# Patient Record
Sex: FEMALE | Race: WHITE | NOT HISPANIC OR LATINO | URBAN - METROPOLITAN AREA
[De-identification: names, ages, dates, MRNs, and addresses within clinical notes are randomized per-mention and may not be internally consistent; named-entity substitution may affect disease eponyms.]

---

## 2023-09-14 ENCOUNTER — OFFICE VISIT (OUTPATIENT)
Dept: FAMILY MEDICINE | Facility: CLINIC | Age: 58
End: 2023-09-14
Payer: COMMERCIAL

## 2023-09-14 VITALS
HEIGHT: 60 IN | SYSTOLIC BLOOD PRESSURE: 120 MMHG | RESPIRATION RATE: 22 BRPM | WEIGHT: 164.4 LBS | BODY MASS INDEX: 32.28 KG/M2 | DIASTOLIC BLOOD PRESSURE: 75 MMHG | HEART RATE: 66 BPM | TEMPERATURE: 98.4 F | OXYGEN SATURATION: 98 %

## 2023-09-14 DIAGNOSIS — Z13.29 SCREENING FOR THYROID DISORDER: ICD-10-CM

## 2023-09-14 DIAGNOSIS — Z13.220 LIPID SCREENING: ICD-10-CM

## 2023-09-14 DIAGNOSIS — Z13.21 ENCOUNTER FOR VITAMIN DEFICIENCY SCREENING: ICD-10-CM

## 2023-09-14 DIAGNOSIS — H72.91 RUPTURED EAR DRUM, RIGHT: ICD-10-CM

## 2023-09-14 DIAGNOSIS — R42 DIZZINESS: ICD-10-CM

## 2023-09-14 DIAGNOSIS — Z13.1 SCREENING FOR DIABETES MELLITUS: ICD-10-CM

## 2023-09-14 DIAGNOSIS — R51.9 NONINTRACTABLE EPISODIC HEADACHE, UNSPECIFIED HEADACHE TYPE: Primary | ICD-10-CM

## 2023-09-14 LAB
ERYTHROCYTE [DISTWIDTH] IN BLOOD BY AUTOMATED COUNT: 11.9 % (ref 10–15)
HBA1C MFR BLD: 5.8 % (ref 0–5.6)
HCT VFR BLD AUTO: 42.4 % (ref 35–47)
HGB BLD-MCNC: 14.3 G/DL (ref 11.7–15.7)
MCH RBC QN AUTO: 28.4 PG (ref 26.5–33)
MCHC RBC AUTO-ENTMCNC: 33.7 G/DL (ref 31.5–36.5)
MCV RBC AUTO: 84 FL (ref 78–100)
PLATELET # BLD AUTO: 266 10E3/UL (ref 150–450)
RBC # BLD AUTO: 5.03 10E6/UL (ref 3.8–5.2)
TSH SERPL DL<=0.005 MIU/L-ACNC: 1.55 UIU/ML (ref 0.3–4.2)
WBC # BLD AUTO: 6.5 10E3/UL (ref 4–11)

## 2023-09-14 PROCEDURE — 82306 VITAMIN D 25 HYDROXY: CPT | Performed by: NURSE PRACTITIONER

## 2023-09-14 PROCEDURE — 80061 LIPID PANEL: CPT | Performed by: NURSE PRACTITIONER

## 2023-09-14 PROCEDURE — 85027 COMPLETE CBC AUTOMATED: CPT | Performed by: NURSE PRACTITIONER

## 2023-09-14 PROCEDURE — 80053 COMPREHEN METABOLIC PANEL: CPT | Performed by: NURSE PRACTITIONER

## 2023-09-14 PROCEDURE — 36415 COLL VENOUS BLD VENIPUNCTURE: CPT | Performed by: NURSE PRACTITIONER

## 2023-09-14 PROCEDURE — 83036 HEMOGLOBIN GLYCOSYLATED A1C: CPT | Performed by: NURSE PRACTITIONER

## 2023-09-14 PROCEDURE — 99204 OFFICE O/P NEW MOD 45 MIN: CPT | Performed by: NURSE PRACTITIONER

## 2023-09-14 PROCEDURE — 84443 ASSAY THYROID STIM HORMONE: CPT | Performed by: NURSE PRACTITIONER

## 2023-09-14 ASSESSMENT — ENCOUNTER SYMPTOMS
HEADACHES: 1
DIZZINESS: 1

## 2023-09-14 ASSESSMENT — PAIN SCALES - GENERAL: PAINLEVEL: NO PAIN (0)

## 2023-09-14 NOTE — PATIENT INSTRUCTIONS
Labs done today.     If labs are normal, then I would recommend an ENT referral to look at your punctured right ear drum.

## 2023-09-14 NOTE — PROGRESS NOTES
"  Assessment & Plan     Nonintractable episodic headache, unspecified headache type  Chronic, but not severe. Lab work up done to r/o possible causes. If all lab work is negative, would recommend ENT referral to see if right ruptured ear drum could be contributing to chronic headaches.     - Comprehensive metabolic panel (BMP + Alb, Alk Phos, ALT, AST, Total. Bili, TP)  - CBC with platelets    Dizziness  Chronic. Only associated with headaches.     - Comprehensive metabolic panel (BMP + Alb, Alk Phos, ALT, AST, Total. Bili, TP)  - CBC with platelets    Ruptured ear drum, right  Chronic. Right hole in ear drum 2/2 flight ~30 years ago. If other work up is normal, will refer to ENT for further evaluation and possible patch surgery.     Encounter for vitamin deficiency screening  Screening.     - Vitamin D Deficiency    Lipid screening  Screening.     - Lipid panel reflex to direct LDL Non-fasting    Screening for thyroid disorder  Screening.     - TSH with free T4 reflex    Screening for diabetes mellitus  Screening.     - Hemoglobin A1c    Ordering of each unique test         BMI:   Estimated body mass index is 32 kg/m  as calculated from the following:    Height as of this encounter: 1.527 m (5' 0.1\").    Weight as of this encounter: 74.6 kg (164 lb 6.4 oz).       Patient Instructions   Labs done today.     If labs are normal, then I would recommend an ENT referral to look at your punctured right ear drum.     Kristen Nunez [facility]    Ishan Claudio is a 58 year old, presenting for the following health issues:  Blood Draw (Would like standard blood levels checked, she has lived in the [place] for the past year, has not been to a doctor, she is feeling good, wondering about going to the eye doctor and a dentist for routine checks etc. ) and Headache (She will get headaches often for many years, had a CT scan in her country in the past they told her there was not a problem there, " "told her to take Benadryl and rest )        9/14/2023     3:11 PM   Additional Questions   Roomed by Kaia   Accompanied by none         9/14/2023     3:11 PM   Patient Reported Additional Medications   Patient reports taking the following new medications Vitamin b12        HPI     Concern - headaches with dizziness during that time, she doesn't get dizzy at other times, only when she gets the headaches   Onset: at least 20 years or so, will get headaches about 3 times a month or if she is thinking too much she will get a headache   Description: as above   Intensity: severe, sometimes are really bad, sometimes will have daily for a week at a time, sometimes they are not too bad  Progression of Symptoms:  same  Accompanying Signs & Symptoms: none  Previous history of similar problem: none  Precipitating factors:        Worsened by: thinking a lot, stress  Alleviating factors:        Improved by: resting, drinking water, cold water on her head sometimes with help   Therapies tried and outcome: None other than Benadryl which does help some     Additional provider notes: Patient presents in clinic for chronic headaches and dizziness and routine labs.     Headaches: chronic for 20+ years. Sometimes they are bad and last a week, other times she is fine. States headaches come every month. Improved with drinking water and taking benadryl. Sometimes she puts cold water on her head. Sometimes she puts a lemon on her head (states that sounds funny, but it works). She doesn't like to take medication as it makes a \"problem for her body\".     Dizziness: off and on symptoms. Appears with headaches. Never shows up by itself.     She is wanting blood work done to check for diabetes, cholesterol.     Right ear drum punctured: incidental finding during exam. Patient reports she had right ear drum rupture after an airplane flight 30 years ago. She was seen and told they could do a procedure, but never had it done.     No Known " "Allergies    No current outpatient medications on file.     No current facility-administered medications for this visit.       No past medical history on file.         Review of Systems   Neurological:  Positive for dizziness and headaches.            Objective    /75 (BP Location: Right arm, Patient Position: Sitting, Cuff Size: Adult Regular)   Pulse 66   Temp 98.4  F (36.9  C) (Tympanic)   Resp 22   Ht 1.527 m (5' 0.1\")   Wt 74.6 kg (164 lb 6.4 oz)   SpO2 98%   Breastfeeding No   BMI 32.00 kg/m    Body mass index is 32 kg/m .  Physical Exam  Vitals reviewed.   Constitutional:       General: She is not in acute distress.     Appearance: Normal appearance. She is not ill-appearing or toxic-appearing.   HENT:      Head: Normocephalic and atraumatic.      Right Ear: Ear canal and external ear normal. There is no impacted cerumen. Tympanic membrane is perforated. Tympanic membrane is not erythematous or bulging.      Left Ear: Tympanic membrane, ear canal and external ear normal. There is no impacted cerumen. Tympanic membrane is not erythematous or bulging.      Nose: Nose normal.      Mouth/Throat:      Mouth: Mucous membranes are moist.      Pharynx: Oropharynx is clear. No posterior oropharyngeal erythema.   Cardiovascular:      Rate and Rhythm: Normal rate and regular rhythm.      Pulses: Normal pulses.      Heart sounds: Normal heart sounds.   Pulmonary:      Effort: Pulmonary effort is normal.      Breath sounds: Normal breath sounds.   Musculoskeletal:         General: Normal range of motion.   Skin:     General: Skin is warm and dry.   Neurological:      Mental Status: She is alert and oriented to person, place, and time.   Psychiatric:         Behavior: Behavior normal.                              "

## 2023-09-15 LAB
ALBUMIN SERPL BCG-MCNC: 4.7 G/DL (ref 3.5–5.2)
ALP SERPL-CCNC: 80 U/L (ref 35–104)
ALT SERPL W P-5'-P-CCNC: 21 U/L (ref 0–50)
ANION GAP SERPL CALCULATED.3IONS-SCNC: 17 MMOL/L (ref 7–15)
AST SERPL W P-5'-P-CCNC: 23 U/L (ref 0–45)
BILIRUB SERPL-MCNC: 0.4 MG/DL
BUN SERPL-MCNC: 19.5 MG/DL (ref 8–23)
CALCIUM SERPL-MCNC: 10.3 MG/DL (ref 8.8–10.2)
CHLORIDE SERPL-SCNC: 99 MMOL/L (ref 98–107)
CHOLEST SERPL-MCNC: 253 MG/DL
CREAT SERPL-MCNC: 0.6 MG/DL (ref 0.51–0.95)
DEPRECATED HCO3 PLAS-SCNC: 22 MMOL/L (ref 22–29)
EGFRCR SERPLBLD CKD-EPI 2021: >90 ML/MIN/1.73M2
GLUCOSE SERPL-MCNC: 107 MG/DL (ref 70–99)
HDLC SERPL-MCNC: 47 MG/DL
LDLC SERPL CALC-MCNC: 127 MG/DL
NONHDLC SERPL-MCNC: 206 MG/DL
POTASSIUM SERPL-SCNC: 4.4 MMOL/L (ref 3.4–5.3)
PROT SERPL-MCNC: 7.9 G/DL (ref 6.4–8.3)
SODIUM SERPL-SCNC: 138 MMOL/L (ref 136–145)
TRIGL SERPL-MCNC: 394 MG/DL

## 2023-09-18 LAB — DEPRECATED CALCIDIOL+CALCIFEROL SERPL-MC: 16 UG/L (ref 20–75)

## 2023-09-21 DIAGNOSIS — E78.2 MIXED HYPERLIPIDEMIA: ICD-10-CM

## 2023-09-21 DIAGNOSIS — E55.9 VITAMIN D DEFICIENCY: Primary | ICD-10-CM

## 2023-09-21 DIAGNOSIS — R73.03 PREDIABETES: ICD-10-CM

## 2023-09-21 RX ORDER — CHOLECALCIFEROL (VITAMIN D3) 50 MCG
1 TABLET ORAL DAILY
Qty: 90 TABLET | Refills: 3 | Status: SHIPPED | OUTPATIENT
Start: 2023-09-21

## 2023-09-22 ENCOUNTER — TELEPHONE (OUTPATIENT)
Dept: FAMILY MEDICINE | Facility: CLINIC | Age: 58
End: 2023-09-22
Payer: COMMERCIAL

## 2023-09-22 DIAGNOSIS — E78.2 MIXED HYPERLIPIDEMIA: ICD-10-CM

## 2023-09-22 DIAGNOSIS — R73.03 PREDIABETES: Primary | ICD-10-CM

## 2023-09-22 NOTE — TELEPHONE ENCOUNTER
Attempt #1 to call patient.     VM not set up - will try later      Essence Torres RN  [facility]: [place]

## 2023-09-22 NOTE — TELEPHONE ENCOUNTER
"----- Message from Kristen Nunez DNP sent at 9/21/2023 11:09 PM CDT -----  Please reach out to patient with lab results:     Normal CBC and TSH.     A1c is slightly elevated putting her in the \"prediabetes\" range. Recommend reducing added sugars, white carbs (changing to whole wheat options), and increasing movement 30 minutes 5 days a week.     CMP: normal except slightly elevated calcium. Corrected calcium (using albumen) is actually only 9.7 so I'm not concerned.     Vitamin D is very low. I would recommend Vitamin D3 supplement 2000 international unit(s). I have sent a script.     Cholesterol panel is significantly elevated/abnormal. I would recommend we start a statin medication to help with her cholesterol and reduce her risk of heart disease. (I can't calculate her ASCVD risk score as her smoking status wasn't filled out. Can you verify what her status is?    The ASCVD Risk score (Amaury BARROW, et al., 2019) failed to calculate for the following reasons:    The smoking status is invalid    Ways to improve your cholesterol...     1- Eats less saturated fats (including avoiding \"trans\" fats).     2 - Eat more unsaturated fats  - found in vegetables, grains, and tree nuts.  Also by replacing butter with canola oil or olive oil.     3 - Eat more nuts.  1-2 ounces (a small handful) of almonds, walnuts, hazelnuts or pecans once a day in place of other less healthy snacks.     4 - Eat more high fiber foods - vegetables and whole grains including oat bran, oats, beans, peas, and flax seed.     5 - Eat more fish - such as salmon, tuna, mackerel, and sardines.  1 or 2 six ounce servings per week is a healthy replacement for other proteins.     6 - Exercise for at least 120 minutes per week - which is equal to 30 minutes 4 days per week.      Thanks,  Kristen Nunez DNP, NP-C   "

## 2023-09-25 RX ORDER — ATORVASTATIN CALCIUM 10 MG/1
10 TABLET, FILM COATED ORAL DAILY
Qty: 90 TABLET | Refills: 3 | Status: SHIPPED | OUTPATIENT
Start: 2023-09-25

## 2023-09-25 NOTE — TELEPHONE ENCOUNTER
Note was updated with smoking status.     The 10-year ASCVD risk score (Amaury BARROW, et al., 2019) is: 5.9%    Values used to calculate the score:      Age: 58 years      Sex: Female      Is Non- : Yes      Diabetic: No      Tobacco smoker: No      Systolic Blood Pressure: 120 mmHg      Is BP treated: No      HDL Cholesterol: 47 mg/dL      Total Cholesterol: 253 mg/dL    Kristen Nunez DNP, NP-C

## 2023-09-25 NOTE — TELEPHONE ENCOUNTER
Rn called and relayed provider message    Patient verbalized understanding and in agreement with plan of care.     Pt reports never smoking     Essence Torres RN

## 2023-09-25 NOTE — TELEPHONE ENCOUNTER
Routing to provider    There is not an rx for statin but it is recommended please place rx.     Pt is also wondering when to recheck A1C    Essence Torres RN

## 2023-09-25 NOTE — TELEPHONE ENCOUNTER
I sent the script for atorvastatin. Let patietn know to monitor for muscle aches/pains as a common side effect and to let us know if it doesn't resolve after 2 weeks on medication.     Follow-up in 6 months for repeat A1c and lipid labs.     Can you also update her smoking status so I can calculate her ASCVD score?    Thanks!  Kristen Nunez DNP, NP-C

## 2024-03-07 ENCOUNTER — TELEPHONE (OUTPATIENT)
Dept: FAMILY MEDICINE | Facility: CLINIC | Age: 59
End: 2024-03-07
Payer: COMMERCIAL

## 2024-03-07 NOTE — LETTER
March 21, 2024    To  Shanon Mirza  [address]  [place] [address]    Your team at [facility] cares about your health. We have reviewed your chart and based on our findings; we are making the following recommendations to better manage your health.     You are in particular need of attention regarding the following:     Call or MyChart message your clinic to schedule a colonoscopy, schedule/ a FIT Test, or order a Cologuard test. If you are unsure what type of test you need, please call your clinic and speak to clinic staff.   Colon cancer is now the second leading cause of cancer-related deaths in the [facility] for both men and women and there are over 130,000 new cases and 50,000 deaths per year from colon cancer. Colonoscopies can prevent 90-95% of these deaths. Problem lesions can be removed before they ever become cancer. This test is not only looking for cancer, but also getting rid of precancerous lesions.   If you are under/uninsured, we recommend you contact the [employer] Program.[employer] is a free colorectal cancer screening program that provides colonoscopies for eligible under/uninsured [place] men and women. If you are interested in receiving a free colonoscopy, please call [facility] at t [phone] (mention code [employer]) to see if you're eligible. Please have them send us the results.   Please call [phone] to schedule a colonoscopy.  Schedule an office visit with your provider if you are interested in completing your colon cancer screening with a Cologuard test  Schedule Annual MAMMOGRAPHY. [facility] scheduling number is [phone] or schedule in [employer] (self referral).  1 in 8 women will develop invasive breast cancer during her lifetime and it is the most common non-skin cancer in [place]. EARLY detection, new treatments, and a better understanding of the disease have increased survival rates- the 5 year survival rate in the 1960's  was 63% and today it is close to 90%.  If you are under/uninsured, we recommend you contact the [facility] Program. They offer mammograms at no charge or on a sliding fee charge. You can schedule with them at [phone]. Please have them send us the results.   Schedule a primary care office visit with your provider for a Pap Smear to screen for Cervical Cancer.  PREVENTATIVE VISIT: Physical    If you have already completed these items, please contact the clinic via phone or   [employer] so your care team can review and update your records. Thank you for   choosing [facility] for your healthcare needs. For any questions,   concerns, or to schedule an appointment please contact our clinic.    Healthy Regards,      Your [facility] Care Team

## 2024-03-07 NOTE — TELEPHONE ENCOUNTER
Patient Quality Outreach    Patient is due for the following:   Colon Cancer Screening  Breast Cancer Screening - Mammogram  Cervical Cancer Screening - PAP Needed  Physical Preventive Adult Physical      Topic Date Due    Diptheria Tetanus Pertussis (DTAP/TDAP/TD) Vaccine (1 - Tdap) Never done    Zoster (Shingles) Vaccine (1 of 2) Never done    Flu Vaccine (1) Never done    COVID-19 Vaccine (1 - 2023-24 season) Never done       Next Steps:   Schedule a Annual Wellness Visit pap    Type of outreach:    Phone, left message for patient/parent to call back.      Questions for provider review:    None           Kaia Frias MA  Chart routed to Care Team.

## 2024-03-21 NOTE — TELEPHONE ENCOUNTER
Patient Quality Outreach    Patient is due for the following:   Colon Cancer Screening  Breast Cancer Screening - Mammogram  Cervical Cancer Screening - PAP Needed  Physical Preventive Adult Physical    Next Steps:   Schedule a Adult Preventative with PAP and Mammogram with radiology Colon CA screen     Type of outreach:    Sent letter.    Next Steps:  Reach out within 90 days via Phone.    Max number of attempts reached: Yes. Will try again in 90 days if patient still on fail list.    Questions for provider review:    None           Kaia Frias MA  Chart routed to none .

## 2024-07-10 ENCOUNTER — OFFICE VISIT (OUTPATIENT)
Dept: FAMILY MEDICINE | Facility: CLINIC | Age: 59
End: 2024-07-10
Payer: COMMERCIAL

## 2024-07-10 VITALS
TEMPERATURE: 98.6 F | HEART RATE: 73 BPM | HEIGHT: 61 IN | BODY MASS INDEX: 32.51 KG/M2 | SYSTOLIC BLOOD PRESSURE: 127 MMHG | WEIGHT: 172.2 LBS | OXYGEN SATURATION: 98 % | DIASTOLIC BLOOD PRESSURE: 75 MMHG

## 2024-07-10 DIAGNOSIS — Z12.31 VISIT FOR SCREENING MAMMOGRAM: ICD-10-CM

## 2024-07-10 DIAGNOSIS — H04.123 DRY EYES: ICD-10-CM

## 2024-07-10 DIAGNOSIS — L03.012 PARONYCHIA OF LEFT THUMB: Primary | ICD-10-CM

## 2024-07-10 DIAGNOSIS — Z12.11 SCREEN FOR COLON CANCER: ICD-10-CM

## 2024-07-10 PROCEDURE — 99214 OFFICE O/P EST MOD 30 MIN: CPT | Performed by: NURSE PRACTITIONER

## 2024-07-10 RX ORDER — CEPHALEXIN 500 MG/1
500 CAPSULE ORAL 4 TIMES DAILY
Qty: 20 CAPSULE | Refills: 0 | Status: SHIPPED | OUTPATIENT
Start: 2024-07-10 | End: 2024-07-15

## 2024-07-10 ASSESSMENT — PAIN SCALES - GENERAL: PAINLEVEL: WORST PAIN (10)

## 2024-07-10 NOTE — LETTER
July 16, 2024    Shanon Mirza  [address] [address]          Dear MsIonYuki,    We are writing to inform you of your test results.  Stool test for colon cancer screen is  Negative.   This  Test should be repeated annually.       Please contact the clinic if you have additional questions.  Thank you.       Resulted Orders   Fecal colorectal cancer screen (FIT)   Result Value Ref Range    Occult Blood Screen FIT Negative Negative       If you have any questions or concerns, please call the clinic at the number listed above.       Sincerely,      MELONIE Andrade CNP

## 2024-07-10 NOTE — PROGRESS NOTES
"  Assessment & Plan     (L03.012) Paronychia of left thumb  (primary encounter diagnosis)  Comment: nail discoloration and paronychia with abscess formation secondary to trauma noted. Supported by the history of trauma, progressive worsening of symptoms, and clinical findings of redness, warmth, tenderness, and purulent discharge.  Plan: cephALEXin (KEFLEX) 500 MG capsule  - Incision and Drainage (I&D) of abscess on the left  thumb.?  - Procedure:?-  Verbal Consent obtained.?   -Area cleaned with alcohol.?  - Puncture incision made with an 18 G needle inserted under the affected area.  - A small amount of white thick material was expressed.?  - Patient tolerated the procedure well.  - Keep the area clean         (H04.123) Dry eyes  Plan: dextran 70-hypromellose (TEARS NATURALE FREE         PF) 0.1-0.3 % ophthalmic solution    (Z12.31) Visit for screening mammogram  Plan: MA Screening Bilateral w/ Armando    (Z12.11) Screen for colon cancer  Plan: Fecal colorectal cancer screen (FIT)             BMI  Estimated body mass index is 33.08 kg/m  as calculated from the following:    Height as of this encounter: 1.537 m (5' 0.5\").    Weight as of this encounter: 78.1 kg (172 lb 3.2 oz).           Ishan Claudio is a 58 year old, presenting for the following health issues:  Thumb Discomfort (Slammed finger a month ago at work.)        7/10/2024     3:38 PM   Additional Questions   Roomed by Kyleigh Haddad MA   Accompanied by Son     History of Present Illness       Reason for visit:  Thumb issue  Symptom onset:  More than a month  Symptoms include:  Sore and discoloration  Symptom intensity:  Moderate  Symptom progression:  Worsening  Had these symptoms before:  No  What makes it worse:  When using soap and disinfectants  What makes it better:  Cream    She eats 0-1 servings of fruits and vegetables daily.She consumes 2 sweetened beverage(s) daily.She exercises with enough effort to increase her heart rate 9 or less " "minutes per day.  She exercises with enough effort to increase her heart rate 3 or less days per week.   She is taking medications regularly.       The patient presents with a history of trauma to the left thumb that occurred one month ago when she hit a box while working. Over the past two weeks, her symptoms have progressively worsened. She reports experiencing redness, warmth, and pain in the nail area, particularly when touched. Additionally, she has noticed a foul smell emanating from the affected thumb. She also observed that the area under the nail is black.    Pt also  reports that her eyes have felt very dry and irritated for the past few months.     Pt is due mammogram and colon cancer screening.               Review of Systems  Constitutional, HEENT, cardiovascular, pulmonary, GI, , musculoskeletal, neuro, skin, endocrine and psych systems are negative, except as otherwise noted.      Objective    /75 (BP Location: Right arm, Patient Position: Chair, Cuff Size: Adult Regular)   Pulse 73   Temp 98.6  F (37  C) (Oral)   Ht 1.537 m (5' 0.5\")   Wt 78.1 kg (172 lb 3.2 oz)   SpO2 98%   BMI 33.08 kg/m    Body mass index is 33.08 kg/m .  Physical Exam  Constitutional:       Appearance: Normal appearance.   HENT:      Head: Normocephalic and atraumatic.      Nose: Nose normal. No congestion or rhinorrhea.   Eyes:      Comments: Mild conjunctival injection bilaterally, no discharge.   Cardiovascular:      Rate and Rhythm: Normal rate and regular rhythm.      Pulses: Normal pulses.      Heart sounds: Normal heart sounds. No murmur heard.  Pulmonary:      Effort: Pulmonary effort is normal.      Breath sounds: Normal breath sounds.   Abdominal:      General: Bowel sounds are normal. There is no distension.      Palpations: Abdomen is soft.   Musculoskeletal:      Cervical back: Normal range of motion and neck supple.   Skin:     Capillary Refill: Capillary refill takes less than 2 seconds.             " Comments: - Left Thumb:  - Inspection: Redness and swelling noted around the nail bed.  - Palpation: Warmth and tenderness upon palpation of the nail bed.  - Nail: Evidence of possible infection with foul-smelling discharge.      Neurological:      Mental Status: She is alert and oriented to person, place, and time.   Psychiatric:         Mood and Affect: Mood normal.         Behavior: Behavior normal.         Thought Content: Thought content normal.         Judgment: Judgment normal.                    Signed Electronically by: MELONIE Andrade CNP  Reviewed Note only.Patient not seen by me.  Jo Elizondo MD

## 2024-07-11 PROCEDURE — 82274 ASSAY TEST FOR BLOOD FECAL: CPT | Performed by: NURSE PRACTITIONER

## 2024-07-16 LAB — HEMOCCULT STL QL IA: NEGATIVE

## 2024-07-16 NOTE — RESULT ENCOUNTER NOTE
Ms. Mirza,    Stool test for colon cancer screen is  Negative.  This  Test should be repeated annually.      Please contact the clinic if you have additional questions.  Thank you.    Sincerely,    MELONIE Andrade CNP

## 2024-07-22 ENCOUNTER — TELEPHONE (OUTPATIENT)
Dept: FAMILY MEDICINE | Facility: CLINIC | Age: 59
End: 2024-07-22
Payer: COMMERCIAL

## 2024-07-22 NOTE — TELEPHONE ENCOUNTER
Patient and daughter calling, notes that patient was seen on 7/10/24 and had patient's thumb nail drained and patient was placed on antibiotics. Patient was advised to return in 2 weeks after completion of antibiotics, daughter notes that patient has now completed antibiotic course.    Patient denies any symptoms of infection but notes that the nail has also not improved. Writer assisted in scheduling recommended follow-up on day that patient/daughter preferred. States understanding to call nurseline with any worsening symptoms or signs of infection.    Thanks,  DEBBIE SpringN RN  [facility]

## 2024-07-25 ENCOUNTER — OFFICE VISIT (OUTPATIENT)
Dept: FAMILY MEDICINE | Facility: CLINIC | Age: 59
End: 2024-07-25
Payer: COMMERCIAL

## 2024-07-25 VITALS
HEIGHT: 61 IN | TEMPERATURE: 98 F | DIASTOLIC BLOOD PRESSURE: 71 MMHG | SYSTOLIC BLOOD PRESSURE: 106 MMHG | BODY MASS INDEX: 31.95 KG/M2 | HEART RATE: 76 BPM | WEIGHT: 169.2 LBS | OXYGEN SATURATION: 96 %

## 2024-07-25 DIAGNOSIS — E78.2 MIXED HYPERLIPIDEMIA: ICD-10-CM

## 2024-07-25 DIAGNOSIS — L29.89 PRURITIC ERYTHEMATOUS RASH: Primary | ICD-10-CM

## 2024-07-25 DIAGNOSIS — Z11.59 NEED FOR HEPATITIS C SCREENING TEST: ICD-10-CM

## 2024-07-25 DIAGNOSIS — L85.3 DRY SKIN: ICD-10-CM

## 2024-07-25 DIAGNOSIS — Z23 NEED FOR TDAP VACCINATION: ICD-10-CM

## 2024-07-25 DIAGNOSIS — Z11.4 SCREENING FOR HIV (HUMAN IMMUNODEFICIENCY VIRUS): ICD-10-CM

## 2024-07-25 PROCEDURE — 90715 TDAP VACCINE 7 YRS/> IM: CPT | Performed by: NURSE PRACTITIONER

## 2024-07-25 PROCEDURE — 99214 OFFICE O/P EST MOD 30 MIN: CPT | Mod: 25 | Performed by: NURSE PRACTITIONER

## 2024-07-25 PROCEDURE — 90471 IMMUNIZATION ADMIN: CPT | Performed by: NURSE PRACTITIONER

## 2024-07-25 RX ORDER — HYDROCORTISONE 2.5 %
CREAM (GRAM) TOPICAL 2 TIMES DAILY
Qty: 30 G | Refills: 0 | Status: SHIPPED | OUTPATIENT
Start: 2024-07-25

## 2024-07-25 RX ORDER — CETIRIZINE HYDROCHLORIDE 10 MG/1
10 TABLET ORAL DAILY
Qty: 30 TABLET | Refills: 0 | Status: SHIPPED | OUTPATIENT
Start: 2024-07-25

## 2024-07-25 RX ORDER — LANOLIN ALCOHOL/MO/W.PET/CERES
CREAM (GRAM) TOPICAL PRN
Qty: 454 G | Refills: 0 | Status: SHIPPED | OUTPATIENT
Start: 2024-07-25

## 2024-07-25 NOTE — PROGRESS NOTES
"  Assessment & Plan     (L29.8) Pruritic erythematous rash  (primary encounter diagnosis)  Comment: Pruritic erythematous rash likely secondary to Keflex.  Plan: hydrocortisone 2.5 % cream, cetirizine (ZYRTEC)        10 MG tablet    (L85.3) Dry skin  Plan: mineral oil-white petrolatum (EUCERIN/MINERIN)         Cream  - Advised the use of emollients and moisturizers.  - Recommended avoiding harsh soaps and prolonged hot showers.      (E78.2) Mixed hyperlipidemia  Plan: Lipid panel reflex to direct LDL Fasting  - Discussed the importance of following a low-cholesterol diet.  - Consider starting statin therapy based on lipid panel results.    (Z11.4) Screening for HIV (human immunodeficiency virus)  Plan: HIV Antigen Antibody Combo    (Z11.59) Need for hepatitis C screening test  Plan: Hepatitis C Screen Reflex to HCV RNA Quant and         Genotype    (Z23) Need for Tdap vaccination  Plan: REVIEW OF HEALTH MAINTENANCE PROTOCOL ORDERS,         TDAP 10-64Y (ADACEL,BOOSTRIX)             BMI  Estimated body mass index is 31.97 kg/m  as calculated from the following:    Height as of this encounter: 1.549 m (5' 1\").    Weight as of this encounter: 76.7 kg (169 lb 3.2 oz).   Weight management plan: Discussed healthy diet and exercise guidelines          Ishan Claudio is a 59 year old, presenting for the following health issues:  Thumb Discomfort (Follow up on Left thumb)        7/25/2024     2:59 PM   Additional Questions   Roomed by Kyleigh Haddad MA   Accompanied by SUPPORT     HPI   The patient presents with a pruritic erythematous rash on the upper arm, flexural areas, and neck, which started after taking Keflex. The patient has completed the course of Keflex. They report itching and have been taking Benadryl for relief. The patient denies any new medications, food, or animal contact. Additionally, the patient reports having dry skin.      Hyperlipidemia Follow-Up  Pt has a history of hyperlipidemia. Not on statin. " "Pt is not following a low-cholesterol diet.          Review of Systems  Constitutional, HEENT, cardiovascular, pulmonary, GI, , musculoskeletal, neuro, skin, endocrine and psych systems are negative, except as otherwise noted.      Objective    /71 (BP Location: Right arm, Patient Position: Chair, Cuff Size: Adult Regular)   Pulse 76   Temp 98  F (36.7  C) (Oral)   Ht 1.549 m (5' 1\")   Wt 76.7 kg (169 lb 3.2 oz)   SpO2 96%   BMI 31.97 kg/m    Body mass index is 31.97 kg/m .  Physical Exam   GENERAL: alert and no distress  NECK: no adenopathy, no asymmetry, masses, or scars  RESP: lungs clear to auscultation - no rales, rhonchi or wheezes  CV: regular rate and rhythm, normal S1 S2, no S3 or S4, no murmur, click or rub, no peripheral edema  ABDOMEN: soft, nontender, no hepatosplenomegaly, no masses and bowel sounds normal  MS: no gross musculoskeletal defects noted, no edema  SKIN: Erythematous rash observed on the upper arm, flexural areas, and neck.  Skin appears dry.  NEURO: Normal strength and tone, mentation intact and speech normal  PSYCH: mentation appears normal, affect normal/bright            Signed Electronically by: MELONIE Andrade CNP    "

## 2024-07-25 NOTE — PATIENT INSTRUCTIONS
1. Itchy Red Rash   - Plan: Use hydrocortisone 2.5% cream on the rash. Take a 10 mg tablet of cetirizine (ZYRTEC) to help with itching.    2. Need for Tdap Vaccination   - Plan: We reviewed your health maintenance needs. You need a Tdap vaccine, which protects against tetanus, diphtheria, and pertussis. You can get either ADACEL or BOOSTRIX.    3. Dry Skin (L85.3)  - Plan: Use a cream with mineral oil and white petrolatum, like EUCERIN or MINERIN, to help keep your skin moisturized.

## 2024-07-28 ENCOUNTER — HEALTH MAINTENANCE LETTER (OUTPATIENT)
Age: 59
End: 2024-07-28

## 2024-12-26 ENCOUNTER — TELEPHONE (OUTPATIENT)
Dept: FAMILY MEDICINE | Facility: CLINIC | Age: 59
End: 2024-12-26
Payer: COMMERCIAL

## 2024-12-26 NOTE — TELEPHONE ENCOUNTER
Patient Quality Outreach    Patient is due for the following:   Breast Cancer Screening - Mammogram  Cervical Cancer Screening - PAP Needed  Physical Preventive Adult Physical      Topic Date Due    Pneumococcal Vaccine (1 of 1 - PCV) Never done    Zoster (Shingles) Vaccine (1 of 2) Never done    Flu Vaccine (1) Never done    COVID-19 Vaccine (1 - 2024-25 season) Never done       Action(s) Taken:   Schedule a Adult Preventative    Type of outreach:    Sent [employer] message.    Questions for provider review:    None           Wily Mcdonnell MA

## 2025-06-11 ENCOUNTER — PATIENT OUTREACH (OUTPATIENT)
Dept: CARE COORDINATION | Facility: CLINIC | Age: 60
End: 2025-06-11
Payer: COMMERCIAL

## 2025-06-12 DIAGNOSIS — Z12.11 COLON CANCER SCREENING: ICD-10-CM

## 2025-06-26 ENCOUNTER — ORDERS ONLY (AUTO-RELEASED) (OUTPATIENT)
Dept: URGENT CARE | Facility: CLINIC | Age: 60
End: 2025-06-26
Payer: COMMERCIAL

## 2025-06-26 DIAGNOSIS — Z12.11 COLON CANCER SCREENING: ICD-10-CM

## 2025-07-30 ENCOUNTER — ANCILLARY PROCEDURE (OUTPATIENT)
Dept: GENERAL RADIOLOGY | Facility: CLINIC | Age: 60
End: 2025-07-30
Attending: ORTHOPAEDIC SURGERY
Payer: COMMERCIAL

## 2025-07-30 ENCOUNTER — OFFICE VISIT (OUTPATIENT)
Dept: ORTHOPEDICS | Facility: CLINIC | Age: 60
End: 2025-07-30
Payer: COMMERCIAL

## 2025-07-30 VITALS — OXYGEN SATURATION: 99 % | DIASTOLIC BLOOD PRESSURE: 70 MMHG | HEART RATE: 71 BPM | SYSTOLIC BLOOD PRESSURE: 119 MMHG

## 2025-07-30 DIAGNOSIS — M75.81 ROTATOR CUFF TENDONITIS, RIGHT: ICD-10-CM

## 2025-07-30 DIAGNOSIS — M75.41 IMPINGEMENT SYNDROME OF RIGHT SHOULDER REGION: ICD-10-CM

## 2025-07-30 DIAGNOSIS — M25.512 LEFT SHOULDER PAIN, UNSPECIFIED CHRONICITY: ICD-10-CM

## 2025-07-30 DIAGNOSIS — M19.011 GLENOHUMERAL ARTHRITIS, RIGHT: ICD-10-CM

## 2025-07-30 DIAGNOSIS — M75.21 BICEPS TENDONITIS ON RIGHT: Primary | ICD-10-CM

## 2025-07-30 PROCEDURE — 99204 OFFICE O/P NEW MOD 45 MIN: CPT | Performed by: ORTHOPAEDIC SURGERY

## 2025-07-30 PROCEDURE — 1125F AMNT PAIN NOTED PAIN PRSNT: CPT | Performed by: ORTHOPAEDIC SURGERY

## 2025-07-30 PROCEDURE — 3078F DIAST BP <80 MM HG: CPT | Performed by: ORTHOPAEDIC SURGERY

## 2025-07-30 PROCEDURE — 73030 X-RAY EXAM OF SHOULDER: CPT | Mod: TC | Performed by: INTERNAL MEDICINE

## 2025-07-30 PROCEDURE — 3074F SYST BP LT 130 MM HG: CPT | Performed by: ORTHOPAEDIC SURGERY

## 2025-07-30 ASSESSMENT — PAIN SCALES - GENERAL: PAINLEVEL_OUTOF10: MODERATE PAIN (5)

## 2025-07-30 NOTE — PROGRESS NOTES
SUBJECTIVE:  Shanon Mirza is a 60 year old female who is seen as self referral for left shoulder injury that occurred that started/occurred  15 days ago.   Cause: reaching for something at work, [employer].  [place] a crack in the wrist but pain was in the shoulder.  Kept working.  Still working.   Present symptoms:   Pain anterior shoulder,   Night pain  Pain with overhead activities   No further cracking  Feels weak   Pain location: anterior pain   Associated symptoms: none    Treatment up to this point: takes vitamins and magnesium.   Prior history of related problems: history of tennis elbow years ago  Significant Orthopedic past medical history: no previous shoulder problems     No past medical history on file.    No past surgical history on file.    REVIEW OF SYSTEMS:  CONSTITUTIONAL:  NEGATIVE for fever, chills, change in weight  INTEGUMENTARY/SKIN:  NEGATIVE for worrisome rashes, moles or lesions  EYES:  NEGATIVE for vision changes or irritation  ENT/MOUTH:  NEGATIVE for ear, mouth and throat problems  RESP:  NEGATIVE for significant cough or SOB  BREAST:  NEGATIVE for masses, tenderness or discharge  CV:  NEGATIVE for chest pain, palpitations or peripheral edema  GI:  NEGATIVE for nausea, abdominal pain, heartburn, or change in bowel habits  :  Negative   MUSCULOSKELETAL:  See HPI above  NEURO:  NEGATIVE for weakness, dizziness or paresthesias  ENDOCRINE:  NEGATIVE for temperature intolerance, skin/hair changes  HEME/ALLERGY/IMMUNE:  NEGATIVE for bleeding problems  PSYCHIATRIC:  NEGATIVE for changes in mood or affect    OBJECTIVE:  /70 (BP Location: Left arm, Patient Position: Sitting, Cuff Size: Adult Regular)   Pulse 71   SpO2 99%      GENERAL: healthy, alert and no distress  GAIT: normal   SKIN: no suspicious lesions or rashes  NEURO: Normal strength and tone, mentation intact and speech normal  VASCULAR:  normal pulses and cappillary refill   PSYCH:  mentation appears normal and affect  normal/bright    MUSCULOSKELETAL:    NECK:  Cervical range of motion: full,  painfree, and does not cause shoulder pain or reproduce shoulder pain.  Sensory deficits:  none      SHOULDER:  Shoulder Inspection: no swelling, bruising, discoloration, or obvious deformity or asymmetry  no atrophy  Tender: AC joint and proximal bicep tendon    Range of Motion:   Active:all normal   Passive: all normal, no crepitations. Pain at midrange  Impingement: Joaquin: 1, Neer: 1, and AER: 2  Strength: forward flexion 5/5, External rotation 5/5, painful  Liftoff: Able Bear Hug: Negative and belly press negative    Special tests: Speed: Positive  Anterior Drawer: 0  Posterior Drawer: 0  O'Tom (SLAP/biceps):  Positive  Camden's negative    X-RAY INTERPRETATION  Mild glenohumeral joint narrowing.      MRI:    none    ASSESSMENT    ICD-10-CM    1. Left shoulder pain, unspecified chronicity  M25.512 Physical Therapy  Referral     CANCELED: XR Shoulder Left G/E 3 Views      2. Impingement syndrome of right shoulder region  M75.41 XR Shoulder Right G/E 3 Views     Physical Therapy  Referral      3. Biceps tendonitis on right  M75.21 Physical Therapy  Referral      4. Rotator cuff tendonitis, right  M75.81 Physical Therapy  Referral      5. Glenohumeral arthritis, right  M19.011 Physical Therapy  Referral          PLAN:   I explained to her the anatomy of the shoulder.  We discussed that her pain could be coming from a variety of or combination of issues.  The pop she felt could have been a biceps subluxation although we could not reproduce this today.  I reassured her that I do not think she has a rotator cuff tear.    I suggested physical therapy, strengthening, modalities.  Work note to avoid overhead reaching and lifting.  Consider MRI or image guided steroid injection to the biceps depending on follow-up exam in the future.      .MARTA Vicente MD  Dept. Orthopedic  Surgery  [facility]

## 2025-07-30 NOTE — LETTER
7/30/2025      Shanon Mirza  [address] [id number]      Dear Colleague,    Thank you for referring your patient, Shanon Mirza, to the [facility]. Please see a copy of my visit note below.    SUBJECTIVE:  Shanon Mirza is a 60 year old female who is seen as self referral for left shoulder injury that occurred that started/occurred  15 days ago.   Cause: reaching for something at work, [employer].  [place] a crack in the wrist but pain was in the shoulder.  Kept working.  Still working.   Present symptoms:   Pain anterior shoulder,   Night pain  Pain with overhead activities   No further cracking  Feels weak   Pain location: anterior pain   Associated symptoms: none    Treatment up to this point: takes vitamins and magnesium.   Prior history of related problems: history of tennis elbow years ago  Significant Orthopedic past medical history: no previous shoulder problems     No past medical history on file.    No past surgical history on file.    REVIEW OF SYSTEMS:  CONSTITUTIONAL:  NEGATIVE for fever, chills, change in weight  INTEGUMENTARY/SKIN:  NEGATIVE for worrisome rashes, moles or lesions  EYES:  NEGATIVE for vision changes or irritation  ENT/MOUTH:  NEGATIVE for ear, mouth and throat problems  RESP:  NEGATIVE for significant cough or SOB  BREAST:  NEGATIVE for masses, tenderness or discharge  CV:  NEGATIVE for chest pain, palpitations or peripheral edema  GI:  NEGATIVE for nausea, abdominal pain, heartburn, or change in bowel habits  :  Negative   MUSCULOSKELETAL:  See HPI above  NEURO:  NEGATIVE for weakness, dizziness or paresthesias  ENDOCRINE:  NEGATIVE for temperature intolerance, skin/hair changes  HEME/ALLERGY/IMMUNE:  NEGATIVE for bleeding problems  PSYCHIATRIC:  NEGATIVE for changes in mood or affect    OBJECTIVE:  /70 (BP Location: Left arm, Patient Position: Sitting, Cuff Size: Adult Regular)   Pulse 71   SpO2 99%      GENERAL: healthy, alert and no  distress  GAIT: normal   SKIN: no suspicious lesions or rashes  NEURO: Normal strength and tone, mentation intact and speech normal  VASCULAR:  normal pulses and cappillary refill   PSYCH:  mentation appears normal and affect normal/bright    MUSCULOSKELETAL:    NECK:  Cervical range of motion: full,  painfree, and does not cause shoulder pain or reproduce shoulder pain.  Sensory deficits:  none      SHOULDER:  Shoulder Inspection: no swelling, bruising, discoloration, or obvious deformity or asymmetry  no atrophy  Tender: AC joint and proximal bicep tendon    Range of Motion:   Active:all normal   Passive: all normal, no crepitations. Pain at midrange  Impingement: Joaquin: 1, Neer: 1, and AER: 2  Strength: forward flexion 5/5, External rotation 5/5, painful  Liftoff: Able Bear Hug: Negative and belly press negative    Special tests: Speed: Positive  Anterior Drawer: 0  Posterior Drawer: 0  O'Tom (SLAP/biceps):  Positive  Harris's negative    X-RAY INTERPRETATION  Mild glenohumeral joint narrowing.      MRI:    none    ASSESSMENT    ICD-10-CM    1. Left shoulder pain, unspecified chronicity  M25.512 Physical Therapy  Referral     CANCELED: XR Shoulder Left G/E 3 Views      2. Impingement syndrome of right shoulder region  M75.41 XR Shoulder Right G/E 3 Views     Physical Therapy  Referral      3. Biceps tendonitis on right  M75.21 Physical Therapy  Referral      4. Rotator cuff tendonitis, right  M75.81 Physical Therapy  Referral      5. Glenohumeral arthritis, right  M19.011 Physical Therapy  Referral          PLAN:   I explained to her the anatomy of the shoulder.  We discussed that her pain could be coming from a variety of or combination of issues.  The pop she felt could have been a biceps subluxation although we could not reproduce this today.  I reassured her that I do not think she has a rotator cuff tear.    I suggested physical therapy, strengthening,  modalities.  Work note to avoid overhead reaching and lifting.  Consider MRI or image guided steroid injection to the biceps depending on follow-up exam in the future.      .MARTA Vicente MD  Dept. Orthopedic Surgery  [facility]    Again, thank you for allowing me to participate in the care of your patient.        Sincerely,        Michael Vicente MD    Electronically signed

## 2025-07-30 NOTE — LETTER
July 30, 2025      Shanon Mirza  [address] [id number]        To Whom It May Concern:    Shanon Mirza was seen in our clinic. She may return to work at [employer] at any time with the following work restrictions for a three month period .    Restrictions: Avoid repetitive overhead lifting or reaching with her right shoulder.      Sincerely,      Michael Vicente        Electronically signed

## 2025-08-10 ENCOUNTER — HEALTH MAINTENANCE LETTER (OUTPATIENT)
Age: 60
End: 2025-08-10

## 2025-08-21 ENCOUNTER — OFFICE VISIT (OUTPATIENT)
Dept: OPHTHALMOLOGY | Facility: CLINIC | Age: 60
End: 2025-08-21
Payer: COMMERCIAL

## 2025-08-21 DIAGNOSIS — H52.4 PRESBYOPIA OF BOTH EYES: ICD-10-CM

## 2025-08-21 DIAGNOSIS — H52.223 REGULAR ASTIGMATISM OF BOTH EYES: ICD-10-CM

## 2025-08-21 DIAGNOSIS — Z01.00 EXAMINATION OF EYES AND VISION: Primary | ICD-10-CM

## 2025-08-21 DIAGNOSIS — H52.02 HYPEROPIA OF LEFT EYE: ICD-10-CM

## 2025-08-21 DIAGNOSIS — H52.11 MYOPIA OF RIGHT EYE: ICD-10-CM

## 2025-08-21 DIAGNOSIS — H43.813 POSTERIOR VITREOUS DETACHMENT OF BOTH EYES: ICD-10-CM

## 2025-08-21 DIAGNOSIS — H04.123 DRY EYE SYNDROME, BILATERAL: ICD-10-CM

## 2025-08-21 ASSESSMENT — REFRACTION_MANIFEST
OD_AXIS: 095
OS_ADD: +2.25
OD_ADD: +2.25
OD_SPHERE: -0.25
OD_CYLINDER: +0.50
OS_AXIS: 176
OS_CYLINDER: +1.25
OS_SPHERE: +0.25

## 2025-08-21 ASSESSMENT — VISUAL ACUITY
OS_CC: J1+
OS_PH_SC: 20/30
OD_SC: 20/30
OD_SC+: -2
OD_SC: J10
OS_SC: 20/50
OS_SC: J10-
OD_CC: J2
METHOD: SNELLEN - LINEAR

## 2025-08-21 ASSESSMENT — TONOMETRY
IOP_METHOD: APPLANATION
OS_IOP_MMHG: 16
OD_IOP_MMHG: 18

## 2025-08-21 ASSESSMENT — CUP TO DISC RATIO
OS_RATIO: 0.3
OD_RATIO: 0.3

## 2025-08-21 ASSESSMENT — SLIT LAMP EXAM - LIDS: COMMENTS: NORMAL

## 2025-08-21 ASSESSMENT — EXTERNAL EXAM - LEFT EYE: OS_EXAM: NORMAL

## 2025-08-21 ASSESSMENT — CONF VISUAL FIELD
METHOD: COUNTING FINGERS
OD_INFERIOR_NASAL_RESTRICTION: 0
OS_SUPERIOR_TEMPORAL_RESTRICTION: 0
OD_INFERIOR_TEMPORAL_RESTRICTION: 0
OD_SUPERIOR_TEMPORAL_RESTRICTION: 0
OS_SUPERIOR_NASAL_RESTRICTION: 0
OS_NORMAL: 1
OS_INFERIOR_TEMPORAL_RESTRICTION: 0
OS_INFERIOR_NASAL_RESTRICTION: 0
OD_SUPERIOR_NASAL_RESTRICTION: 0
OD_NORMAL: 1

## 2025-08-21 ASSESSMENT — REFRACTION_WEARINGRX
SPECS_TYPE: OTC READERS
OD_SPHERE: +2.00
OS_CYLINDER: SPHERE
OS_SPHERE: +2.00
OD_CYLINDER: SPHERE

## 2025-08-21 ASSESSMENT — EXTERNAL EXAM - RIGHT EYE: OD_EXAM: NORMAL
